# Patient Record
Sex: MALE | Race: WHITE
[De-identification: names, ages, dates, MRNs, and addresses within clinical notes are randomized per-mention and may not be internally consistent; named-entity substitution may affect disease eponyms.]

---

## 2017-06-06 ENCOUNTER — PRIOR ORIGINAL RECORDS (OUTPATIENT)
Dept: OTHER | Age: 82
End: 2017-06-06

## 2017-09-12 ENCOUNTER — PRIOR ORIGINAL RECORDS (OUTPATIENT)
Dept: OTHER | Age: 82
End: 2017-09-12

## 2018-04-02 ENCOUNTER — PRIOR ORIGINAL RECORDS (OUTPATIENT)
Dept: OTHER | Age: 83
End: 2018-04-02

## 2018-05-03 ENCOUNTER — PRIOR ORIGINAL RECORDS (OUTPATIENT)
Dept: OTHER | Age: 83
End: 2018-05-03

## 2018-05-04 ENCOUNTER — PRIOR ORIGINAL RECORDS (OUTPATIENT)
Dept: OTHER | Age: 83
End: 2018-05-04

## 2018-05-04 LAB
ALBUMIN: 4.1 G/DL
ALKALINE PHOSPHATATE(ALK PHOS): 99 IU/L
ALT (SGPT): 13 U/L
AST (SGOT): 18 U/L
BILIRUBIN TOTAL: 0.7 MG/DL
BUN: 21 MG/DL
CALCIUM: 9.4 MG/DL
CHLORIDE: 102 MEQ/L
CREATININE, SERUM: 1.64 MG/DL
FREE T4: 1.3 MG/DL
GLOBULIN: 2.8 G/DL
GLUCOSE: 93 MG/DL
HEMATOCRIT: 30.3 %
HEMOGLOBIN: 10.4 G/DL
PLATELETS: 99 K/UL
POTASSIUM, SERUM: 4.2 MEQ/L
PROTEIN, TOTAL: 6.9 G/DL
RED BLOOD COUNT: 3.19 X 10-6/U
SGOT (AST): 18 IU/L
SGPT (ALT): 13 IU/L
SODIUM: 136 MEQ/L
THYROID STIMULATING HORMONE: 4.13 MLU/L
WHITE BLOOD COUNT: 7.7 X 10-3/U

## 2018-05-08 ENCOUNTER — PRIOR ORIGINAL RECORDS (OUTPATIENT)
Dept: OTHER | Age: 83
End: 2018-05-08

## 2018-05-08 ENCOUNTER — HOSPITAL (OUTPATIENT)
Dept: OTHER | Age: 83
End: 2018-05-08
Attending: INTERNAL MEDICINE

## 2018-05-08 LAB
ANALYZER ANC (IANC): ABNORMAL
ANION GAP SERPL CALC-SCNC: 15 MMOL/L (ref 10–20)
BUN SERPL-MCNC: 22 MG/DL (ref 6–20)
BUN/CREAT SERPL: 12 (ref 7–25)
CALCIUM SERPL-MCNC: 9.1 MG/DL (ref 8.4–10.2)
CHLORIDE: 102 MMOL/L (ref 98–107)
CO2 SERPL-SCNC: 24 MMOL/L (ref 21–32)
CREAT SERPL-MCNC: 1.8 MG/DL (ref 0.67–1.17)
ERYTHROCYTE [DISTWIDTH] IN BLOOD: 13 % (ref 11–15)
GLUCOSE SERPL-MCNC: 109 MG/DL (ref 65–99)
HEMATOCRIT: 33.2 % (ref 39–51)
HGB BLD-MCNC: 11.2 GM/DL (ref 13–17)
MCH RBC QN AUTO: 31.7 PG (ref 26–34)
MCHC RBC AUTO-ENTMCNC: 33.7 GM/DL (ref 32–36.5)
MCV RBC AUTO: 94.1 FL (ref 78–100)
PERCENT NRBC: ABNORMAL
PLATELET # BLD: 215 THOUSAND/MCL (ref 140–450)
POTASSIUM SERPL-SCNC: 4.2 MMOL/L (ref 3.4–5.1)
RBC # BLD: 3.53 MILLION/MCL (ref 4.5–5.9)
SODIUM SERPL-SCNC: 137 MMOL/L (ref 135–145)
WBC # BLD: 7.4 THOUSAND/MCL (ref 4.2–11)

## 2018-05-09 LAB
BUN: 22 MG/DL
CALCIUM: 9.1 MG/DL
CHLORIDE: 102 MEQ/L
CREATININE, SERUM: 1.8 MG/DL
GLUCOSE: 109 MG/DL
HEMATOCRIT: 33.2 %
HEMOGLOBIN: 11.2 G/DL
PLATELETS: 215 K/UL
POTASSIUM, SERUM: 4.2 MEQ/L
RED BLOOD COUNT: 3.53 X 10-6/U
SODIUM: 137 MEQ/L
WHITE BLOOD COUNT: 7.4 X 10-3/U

## 2018-05-15 ENCOUNTER — PRIOR ORIGINAL RECORDS (OUTPATIENT)
Dept: OTHER | Age: 83
End: 2018-05-15

## 2018-05-22 ENCOUNTER — TELEPHONE (OUTPATIENT)
Dept: INTERVENTIONAL RADIOLOGY/VASCULAR | Facility: HOSPITAL | Age: 83
End: 2018-05-22

## 2018-06-12 ENCOUNTER — PRIOR ORIGINAL RECORDS (OUTPATIENT)
Dept: OTHER | Age: 83
End: 2018-06-12

## 2018-06-14 ENCOUNTER — HOSPITAL ENCOUNTER (OUTPATIENT)
Dept: CT IMAGING | Facility: HOSPITAL | Age: 83
Discharge: HOME | End: 2018-06-14
Attending: THORACIC SURGERY (CARDIOTHORACIC VASCULAR SURGERY)
Payer: MEDICARE

## 2018-06-14 ENCOUNTER — MYAURORA ACCOUNT LINK (OUTPATIENT)
Dept: OTHER | Age: 83
End: 2018-06-14

## 2018-06-14 ENCOUNTER — PRIOR ORIGINAL RECORDS (OUTPATIENT)
Dept: OTHER | Age: 83
End: 2018-06-14

## 2018-06-14 ENCOUNTER — NURSE ONLY (OUTPATIENT)
Dept: RESPIRATORY THERAPY | Facility: HOSPITAL | Age: 83
End: 2018-06-14
Attending: THORACIC SURGERY (CARDIOTHORACIC VASCULAR SURGERY)
Payer: MEDICARE

## 2018-06-14 ENCOUNTER — HOSPITAL ENCOUNTER (OUTPATIENT)
Dept: CV DIAGNOSTICS | Facility: HOSPITAL | Age: 83
Discharge: HOME | End: 2018-06-14
Attending: INTERNAL MEDICINE
Payer: MEDICARE

## 2018-06-14 ENCOUNTER — HOSPITAL ENCOUNTER (OUTPATIENT)
Dept: ULTRASOUND IMAGING | Facility: HOSPITAL | Age: 83
Discharge: HOME | End: 2018-06-14
Attending: THORACIC SURGERY (CARDIOTHORACIC VASCULAR SURGERY)
Payer: MEDICARE

## 2018-06-14 DIAGNOSIS — I35.0 AORTIC STENOSIS: ICD-10-CM

## 2018-06-14 DIAGNOSIS — I35.0 NONRHEUMATIC AORTIC VALVE STENOSIS: ICD-10-CM

## 2018-06-14 PROCEDURE — 93018 CV STRESS TEST I&R ONLY: CPT | Performed by: INTERNAL MEDICINE

## 2018-06-14 PROCEDURE — 93017 CV STRESS TEST TRACING ONLY: CPT | Performed by: INTERNAL MEDICINE

## 2018-06-14 PROCEDURE — 71275 CT ANGIOGRAPHY CHEST: CPT | Performed by: THORACIC SURGERY (CARDIOTHORACIC VASCULAR SURGERY)

## 2018-06-14 PROCEDURE — 93350 STRESS TTE ONLY: CPT | Performed by: INTERNAL MEDICINE

## 2018-06-14 PROCEDURE — 94010 BREATHING CAPACITY TEST: CPT

## 2018-06-14 PROCEDURE — 93880 EXTRACRANIAL BILAT STUDY: CPT | Performed by: THORACIC SURGERY (CARDIOTHORACIC VASCULAR SURGERY)

## 2018-06-14 PROCEDURE — 75635 CT ANGIO ABDOMINAL ARTERIES: CPT | Performed by: THORACIC SURGERY (CARDIOTHORACIC VASCULAR SURGERY)

## 2018-06-14 RX ORDER — DOBUTAMINE HYDROCHLORIDE 100 MG/100ML
INJECTION INTRAVENOUS
Status: COMPLETED
Start: 2018-06-14 | End: 2018-06-14

## 2018-06-14 RX ADMIN — DOBUTAMINE HYDROCHLORIDE 5 MCG/KG/MIN: 100 INJECTION INTRAVENOUS at 14:45:00

## 2018-06-29 ENCOUNTER — PRIOR ORIGINAL RECORDS (OUTPATIENT)
Dept: OTHER | Age: 83
End: 2018-06-29

## 2018-07-03 NOTE — PROCEDURES
Tobi Uribe is a 19-year-old  male who stands 6 feet tall and weighs 190 pounds. He underwent simple spirometry in 6/14/18. He is under evaluation for a T AVR. No smoking history is recorded.   Results are as follows:    FVC is 2.96 L which

## 2018-07-05 ENCOUNTER — PRIOR ORIGINAL RECORDS (OUTPATIENT)
Dept: OTHER | Age: 83
End: 2018-07-05

## 2018-07-12 ENCOUNTER — TELEPHONE (OUTPATIENT)
Dept: CARDIOLOGY CLINIC | Facility: HOSPITAL | Age: 83
End: 2018-07-12

## 2018-07-12 NOTE — PROGRESS NOTES
Preop TAVR Instructions:    I spoke with patient/family and provided the following preop/admission instructions for upcoming transcatheter valve surgery on 7/19/18:  ·   I advised they come in the day prior to surgery at 11am, Mayank christianson and register as a

## 2018-07-12 NOTE — HISTORICAL OFFICE NOTE
Osmel Diego  : 1925  ACCOUNT:  [id number]  [phone]  PCP: Dr. Jason Galicia    TODAY'S DATE:  2018  DICTATED BY:  DEVIN Palomino at [facility] 694:  Severe a Prilosec, Flonase, fludrocortisone, Toprol, glucosamine, niacin, Casodex, Lipitor, and Synthroid. ALLERGIES:   The patient has no known drug allergies.          SOCIAL HISTORY:   The patient currently does not abuse ethanol or tobacco.       FAMILY HI

## 2018-07-12 NOTE — H&P
[facility]  [facility]    Masha Posadas Patient Status:  Inpatient    1925 MRN [id number]   [facility] 8NE-A Attending Alyssa Roberts MD   Hosp Day # 0 PCP Flakito Chandra MD     History of Present Illness: Drug use: No                 Family Hostory:  Family History   Problem Relation Age of Onset   • Heart Disorder Father    • Heart Disorder Mother         Medications:    No prescriptions prior to admission. Allergies:  Patient has no known allergies. challenge  Review of echocardiographic images with increasing doses of dobutamine suggest a mild increase in overall LV contractility with a low normal LVEF of approximately 50% visually estimated.  The LVOT VTI remains unchanged and normal at 5 mcg of dobu questions; plan for TAVR in am; likely right TF; his creat his 1.5 and he has a PPM    Yfn Wagner MD [facility]

## 2018-07-18 ENCOUNTER — ANESTHESIA EVENT (OUTPATIENT)
Dept: CARDIAC SURGERY | Facility: HOSPITAL | Age: 83
DRG: 267 | End: 2018-07-18
Payer: MEDICARE

## 2018-07-18 ENCOUNTER — APPOINTMENT (OUTPATIENT)
Dept: GENERAL RADIOLOGY | Facility: HOSPITAL | Age: 83
DRG: 267 | End: 2018-07-18
Attending: NURSE PRACTITIONER
Payer: MEDICARE

## 2018-07-18 ENCOUNTER — HOSPITAL ENCOUNTER (INPATIENT)
Facility: HOSPITAL | Age: 83
LOS: 5 days | Discharge: HOME HEALTH | DRG: 267 | End: 2018-07-23
Attending: INTERNAL MEDICINE | Admitting: INTERNAL MEDICINE
Payer: MEDICARE

## 2018-07-18 LAB
ALBUMIN SERPL-MCNC: 3.7 G/DL (ref 3.5–4.8)
ALBUMIN/GLOB SERPL: 1 {RATIO} (ref 1–2)
ALP LIVER SERPL-CCNC: 95 U/L (ref 45–117)
ALT SERPL-CCNC: 17 U/L (ref 17–63)
ANION GAP SERPL CALC-SCNC: 8 MMOL/L (ref 0–18)
ANTIBODY SCREEN: NEGATIVE
AST SERPL-CCNC: 17 U/L (ref 15–41)
ATRIAL RATE: 62 BPM
BASOPHILS # BLD AUTO: 0.04 X10(3) UL (ref 0–0.1)
BASOPHILS NFR BLD AUTO: 0.4 %
BILIRUB SERPL-MCNC: 0.5 MG/DL (ref 0.1–2)
BUN BLD-MCNC: 21 MG/DL (ref 8–20)
BUN/CREAT SERPL: 13 (ref 10–20)
CALCIUM BLD-MCNC: 8.9 MG/DL (ref 8.3–10.3)
CHLORIDE: 105 MMOL/L (ref 101–111)
CO2: 26 MMOL/L (ref 22–32)
CREAT BLD-MCNC: 1.62 MG/DL (ref 0.7–1.3)
EOSINOPHIL # BLD AUTO: 0.33 X10(3) UL (ref 0–0.3)
EOSINOPHIL NFR BLD AUTO: 3.4 %
ERYTHROCYTE [DISTWIDTH] IN BLOOD BY AUTOMATED COUNT: 13.4 % (ref 11.5–16)
EST. AVERAGE GLUCOSE BLD GHB EST-MCNC: 123 MG/DL (ref 68–126)
GLOBULIN PLAS-MCNC: 3.7 G/DL (ref 2.5–3.7)
GLUCOSE BLD-MCNC: 99 MG/DL (ref 70–99)
HAV IGM SER QL: 2.1 MG/DL (ref 1.8–2.5)
HBA1C MFR BLD HPLC: 5.9 % (ref ?–5.7)
HCT VFR BLD AUTO: 30.9 % (ref 37–53)
HGB BLD-MCNC: 10.3 G/DL (ref 13–17)
IMMATURE GRANULOCYTE COUNT: 0.03 X10(3) UL (ref 0–1)
IMMATURE GRANULOCYTE RATIO %: 0.3 %
INR BLD: 1.1 (ref 0.9–1.1)
LYMPHOCYTES # BLD AUTO: 2.34 X10(3) UL (ref 0.9–4)
LYMPHOCYTES NFR BLD AUTO: 24.4 %
M PROTEIN MFR SERPL ELPH: 7.4 G/DL (ref 6.1–8.3)
MCH RBC QN AUTO: 31.2 PG (ref 27–33.2)
MCHC RBC AUTO-ENTMCNC: 33.3 G/DL (ref 31–37)
MCV RBC AUTO: 93.6 FL (ref 80–99)
MONOCYTES # BLD AUTO: 0.91 X10(3) UL (ref 0.1–1)
MONOCYTES NFR BLD AUTO: 9.5 %
NEUTROPHIL ABS PRELIM: 5.93 X10 (3) UL (ref 1.3–6.7)
NEUTROPHILS # BLD AUTO: 5.93 X10(3) UL (ref 1.3–6.7)
NEUTROPHILS NFR BLD AUTO: 62 %
OSMOLALITY SERPL CALC.SUM OF ELEC: 291 MOSM/KG (ref 275–295)
P-R INTERVAL: 186 MS
PLATELET # BLD AUTO: 194 10(3)UL (ref 150–450)
POTASSIUM SERPL-SCNC: 4.1 MMOL/L (ref 3.6–5.1)
PRO-BETA NATRIURETIC PEPTIDE: 1033 PG/ML (ref ?–450)
PSA SERPL DL<=0.01 NG/ML-MCNC: 14.6 SECONDS (ref 12.4–14.7)
Q-T INTERVAL: 518 MS
QRS DURATION: 200 MS
QTC CALCULATION (BEZET): 525 MS
R AXIS: -62 DEGREES
RBC # BLD AUTO: 3.3 X10(6)UL (ref 3.8–5.8)
RED CELL DISTRIBUTION WIDTH-SD: 46.3 FL (ref 35.1–46.3)
RH BLOOD TYPE: POSITIVE
SODIUM SERPL-SCNC: 139 MMOL/L (ref 136–144)
T AXIS: 94 DEGREES
VENTRICULAR RATE: 62 BPM
WBC # BLD AUTO: 9.6 X10(3) UL (ref 4–13)

## 2018-07-18 PROCEDURE — 83735 ASSAY OF MAGNESIUM: CPT | Performed by: NURSE PRACTITIONER

## 2018-07-18 PROCEDURE — 86850 RBC ANTIBODY SCREEN: CPT | Performed by: NURSE PRACTITIONER

## 2018-07-18 PROCEDURE — 85610 PROTHROMBIN TIME: CPT | Performed by: NURSE PRACTITIONER

## 2018-07-18 PROCEDURE — 83880 ASSAY OF NATRIURETIC PEPTIDE: CPT | Performed by: NURSE PRACTITIONER

## 2018-07-18 PROCEDURE — 36569 INSJ PICC 5 YR+ W/O IMAGING: CPT

## 2018-07-18 PROCEDURE — 93005 ELECTROCARDIOGRAM TRACING: CPT

## 2018-07-18 PROCEDURE — 86901 BLOOD TYPING SEROLOGIC RH(D): CPT | Performed by: NURSE PRACTITIONER

## 2018-07-18 PROCEDURE — 93010 ELECTROCARDIOGRAM REPORT: CPT | Performed by: INTERNAL MEDICINE

## 2018-07-18 PROCEDURE — 86920 COMPATIBILITY TEST SPIN: CPT

## 2018-07-18 PROCEDURE — 76937 US GUIDE VASCULAR ACCESS: CPT

## 2018-07-18 PROCEDURE — 85025 COMPLETE CBC W/AUTO DIFF WBC: CPT | Performed by: NURSE PRACTITIONER

## 2018-07-18 PROCEDURE — 05H533Z INSERTION OF INFUSION DEVICE INTO RIGHT SUBCLAVIAN VEIN, PERCUTANEOUS APPROACH: ICD-10-PCS | Performed by: INTERNAL MEDICINE

## 2018-07-18 PROCEDURE — 83036 HEMOGLOBIN GLYCOSYLATED A1C: CPT | Performed by: NURSE PRACTITIONER

## 2018-07-18 PROCEDURE — 86900 BLOOD TYPING SEROLOGIC ABO: CPT | Performed by: NURSE PRACTITIONER

## 2018-07-18 PROCEDURE — 87081 CULTURE SCREEN ONLY: CPT | Performed by: NURSE PRACTITIONER

## 2018-07-18 PROCEDURE — 36592 COLLECT BLOOD FROM PICC: CPT

## 2018-07-18 PROCEDURE — 80053 COMPREHEN METABOLIC PANEL: CPT | Performed by: NURSE PRACTITIONER

## 2018-07-18 PROCEDURE — 71045 X-RAY EXAM CHEST 1 VIEW: CPT | Performed by: NURSE PRACTITIONER

## 2018-07-18 RX ORDER — POLYETHYLENE GLYCOL 3350 17 G/17G
17 POWDER, FOR SOLUTION ORAL DAILY
COMMUNITY

## 2018-07-18 RX ORDER — CHLORHEXIDINE GLUCONATE 4 G/100ML
30 SOLUTION TOPICAL ONCE
Status: COMPLETED | OUTPATIENT
Start: 2018-07-18 | End: 2018-07-18

## 2018-07-18 RX ORDER — LEVOTHYROXINE SODIUM 0.05 MG/1
50 TABLET ORAL EVERY OTHER DAY
COMMUNITY

## 2018-07-18 RX ORDER — ATORVASTATIN CALCIUM 40 MG/1
40 TABLET, FILM COATED ORAL NIGHTLY
Status: DISCONTINUED | OUTPATIENT
Start: 2018-07-18 | End: 2018-07-23

## 2018-07-18 RX ORDER — POLYETHYLENE GLYCOL 3350 17 G/17G
17 POWDER, FOR SOLUTION ORAL DAILY
Status: DISCONTINUED | OUTPATIENT
Start: 2018-07-19 | End: 2018-07-23

## 2018-07-18 RX ORDER — MULTIVIT-MIN/IRON FUM/FOLIC AC 7.5 MG-4
1 TABLET ORAL DAILY
COMMUNITY

## 2018-07-18 RX ORDER — FLUTICASONE PROPIONATE 50 MCG
1 SPRAY, SUSPENSION (ML) NASAL 2 TIMES DAILY
COMMUNITY

## 2018-07-18 RX ORDER — LEVOTHYROXINE SODIUM 0.1 MG/1
100 TABLET ORAL EVERY OTHER DAY
Status: DISCONTINUED | OUTPATIENT
Start: 2018-07-19 | End: 2018-07-23

## 2018-07-18 RX ORDER — FLUTICASONE PROPIONATE 50 MCG
1 SPRAY, SUSPENSION (ML) NASAL 2 TIMES DAILY
Status: DISCONTINUED | OUTPATIENT
Start: 2018-07-18 | End: 2018-07-23

## 2018-07-18 RX ORDER — LEVOTHYROXINE SODIUM 0.05 MG/1
100 TABLET ORAL EVERY OTHER DAY
COMMUNITY

## 2018-07-18 RX ORDER — OXYBUTYNIN CHLORIDE 5 MG/1
5 TABLET ORAL 3 TIMES DAILY
Status: DISCONTINUED | OUTPATIENT
Start: 2018-07-18 | End: 2018-07-20

## 2018-07-18 RX ORDER — SODIUM CHLORIDE 9 MG/ML
INJECTION, SOLUTION INTRAVENOUS CONTINUOUS
Status: DISCONTINUED | OUTPATIENT
Start: 2018-07-18 | End: 2018-07-18

## 2018-07-18 RX ORDER — SODIUM CHLORIDE 0.9 % (FLUSH) 0.9 %
10 SYRINGE (ML) INJECTION EVERY 12 HOURS
Status: DISCONTINUED | OUTPATIENT
Start: 2018-07-18 | End: 2018-07-23

## 2018-07-18 RX ORDER — METOPROLOL SUCCINATE 50 MG/1
50 TABLET, EXTENDED RELEASE ORAL
Status: DISCONTINUED | OUTPATIENT
Start: 2018-07-19 | End: 2018-07-22

## 2018-07-18 RX ORDER — SODIUM CHLORIDE 9 MG/ML
INJECTION, SOLUTION INTRAVENOUS CONTINUOUS
Status: DISCONTINUED | OUTPATIENT
Start: 2018-07-19 | End: 2018-07-19

## 2018-07-18 RX ORDER — MELATONIN
500 DAILY
Status: DISCONTINUED | OUTPATIENT
Start: 2018-07-19 | End: 2018-07-23

## 2018-07-18 RX ORDER — ACETAMINOPHEN 325 MG/1
650 TABLET ORAL NIGHTLY
Status: DISCONTINUED | OUTPATIENT
Start: 2018-07-18 | End: 2018-07-23

## 2018-07-18 RX ORDER — LEVOTHYROXINE SODIUM 0.05 MG/1
50 TABLET ORAL EVERY OTHER DAY
Status: DISCONTINUED | OUTPATIENT
Start: 2018-07-20 | End: 2018-07-23

## 2018-07-18 RX ORDER — OMEPRAZOLE 20 MG/1
20 CAPSULE, DELAYED RELEASE ORAL
COMMUNITY

## 2018-07-18 NOTE — PROGRESS NOTES
1217-Direct admit from home plan for elective TAVR tomorrow  Cc dyspnea worsening and fatigue  A/o x 4; follows commands  shu simpson  Seen patient and did consents  Patient oriented to room; use of call light and tv controls  Bed locked in lowest

## 2018-07-18 NOTE — CM/SW NOTE
07/18/18 1600   CM/SW Referral Data   Referral Source Physician   Reason for Referral Discharge planning;Protocol order set   Specify order set TAVR   Informant Patient   Pertinent Medical Hx   Primary Care Physician Name Ruthie Gray   Patient Info

## 2018-07-18 NOTE — PLAN OF CARE
Rec in bed. Pt and VS appear stable. TAVR tomorrow   Pt refusing to get up and walk. Agrees to sit in chair for dinner. Will try to encourage 5M walk before shifts end. Midline placed and labs sent. Needs met call light in reach will cont to monitor.

## 2018-07-18 NOTE — ANESTHESIA PREPROCEDURE EVALUATION
PRE-OP EVALUATION    Patient Name: Juan Manuel Gutierrez    Pre-op Diagnosis: aortic stenosis    Procedure(s):  aortic valve replacement, 26mm Montana, right femoral approach    Surgeon(s) and Role:     * Eris Camacho MD - Primary     * Fabi Bocanegra MD nightly. Disp:  Rfl:    bicalutamide (CASODEX) 50 MG Oral Tab Take 50 mg by mouth 4 (four) times a week. Disp:  Rfl:    Acetaminophen (TYLENOL 8 HOUR OR) Take by mouth nightly.    Disp:  Rfl:    Oxybutynin Chloride (DITROPAN) 5 MG Oral Tab Take 5 mg by mo documents mildly depressed LV systolic function with moderate to severe aortic stenosis at baseline.  With increasing doses of dobutamine there is no evidence of a contractile reserve, however, the increase in aortic valve peak velocity and mean gradient is 4.1 07/18/2018    07/18/2018   CO2 26.0 07/18/2018   BUN 21 (H) 07/18/2018   CREATSERUM 1.62 (H) 07/18/2018   GLU 99 07/18/2018   CA 8.9 07/18/2018       Lab Results  Component Value Date   INR 1.10 07/18/2018         Airway      Mallampati: II  Mout

## 2018-07-19 ENCOUNTER — APPOINTMENT (OUTPATIENT)
Dept: CV DIAGNOSTICS | Facility: HOSPITAL | Age: 83
DRG: 267 | End: 2018-07-19
Attending: NURSE PRACTITIONER
Payer: MEDICARE

## 2018-07-19 ENCOUNTER — SURGERY (OUTPATIENT)
Age: 83
End: 2018-07-19

## 2018-07-19 ENCOUNTER — APPOINTMENT (OUTPATIENT)
Dept: GENERAL RADIOLOGY | Facility: HOSPITAL | Age: 83
DRG: 267 | End: 2018-07-19
Attending: NURSE PRACTITIONER
Payer: MEDICARE

## 2018-07-19 ENCOUNTER — ANESTHESIA (OUTPATIENT)
Dept: CARDIAC SURGERY | Facility: HOSPITAL | Age: 83
DRG: 267 | End: 2018-07-19
Payer: MEDICARE

## 2018-07-19 PROBLEM — Z95.2 S/P TAVR (TRANSCATHETER AORTIC VALVE REPLACEMENT): Status: ACTIVE | Noted: 2018-07-19

## 2018-07-19 LAB
ALBUMIN SERPL-MCNC: 3.2 G/DL (ref 3.5–4.8)
ALBUMIN/GLOB SERPL: 0.9 {RATIO} (ref 1–2)
ALP LIVER SERPL-CCNC: 88 U/L (ref 45–117)
ALT SERPL-CCNC: 15 U/L (ref 17–63)
ANION GAP SERPL CALC-SCNC: 7 MMOL/L (ref 0–18)
APTT PPP: 48.2 SECONDS (ref 26.1–34.6)
AST SERPL-CCNC: 19 U/L (ref 15–41)
ATRIAL RATE: 60 BPM
BILIRUB SERPL-MCNC: 0.6 MG/DL (ref 0.1–2)
BUN BLD-MCNC: 17 MG/DL (ref 8–20)
BUN/CREAT SERPL: 12.1 (ref 10–20)
CALCIUM BLD-MCNC: 8.5 MG/DL (ref 8.3–10.3)
CHLORIDE SERPL-SCNC: 112 MMOL/L (ref 101–111)
CO2 SERPL-SCNC: 24 MMOL/L (ref 22–32)
CREAT BLD-MCNC: 1.4 MG/DL (ref 0.7–1.3)
ERYTHROCYTE [DISTWIDTH] IN BLOOD BY AUTOMATED COUNT: 13.4 % (ref 11.5–16)
FIBRINOGEN: 246 MG/DL (ref 200–446)
GLOBULIN PLAS-MCNC: 3.4 G/DL (ref 2.5–3.7)
GLUCOSE BLD-MCNC: 105 MG/DL (ref 70–99)
GLUCOSE BLD-MCNC: 105 MG/DL (ref 70–99)
HAV IGM SER QL: 2 MG/DL (ref 1.8–2.5)
HCT VFR BLD AUTO: 30.1 % (ref 37–53)
HGB BLD-MCNC: 9.9 G/DL (ref 13–17)
INR BLD: 1.22 (ref 0.9–1.1)
ISTAT ACTIVATED CLOTTING TIME: 125 SECONDS (ref 74–137)
ISTAT ACTIVATED CLOTTING TIME: 356 SECONDS (ref 74–137)
ISTAT BLOOD GAS BASE DEFICIT: -1 MMOL/L
ISTAT BLOOD GAS HCO3: 24 MEQ/L (ref 22–26)
ISTAT BLOOD GAS O2 SATURATION: 100 % (ref 92–100)
ISTAT BLOOD GAS PCO2: 41 MMHG (ref 35–45)
ISTAT BLOOD GAS PH: 7.37 (ref 7.35–7.45)
ISTAT BLOOD GAS PO2: 269 MMHG (ref 80–105)
ISTAT BLOOD GAS TCO2: 25 MMOL/L (ref 22–32)
ISTAT HEMATOCRIT: 29 % (ref 37–53)
ISTAT IONIZED CALCIUM: 1.23 MMOL/L (ref 1.12–1.32)
ISTAT POTASSIUM: 3.6 MMOL/L (ref 3.6–5.1)
ISTAT SODIUM: 142 MMOL/L (ref 136–144)
M PROTEIN MFR SERPL ELPH: 6.6 G/DL (ref 6.1–8.3)
MCH RBC QN AUTO: 30.2 PG (ref 27–33.2)
MCHC RBC AUTO-ENTMCNC: 32.9 G/DL (ref 31–37)
MCV RBC AUTO: 91.8 FL (ref 80–99)
OSMOLALITY SERPL CALC.SUM OF ELEC: 298 MOSM/KG (ref 275–295)
P-R INTERVAL: 220 MS
PHOSPHATE SERPL-MCNC: 2.8 MG/DL (ref 2.5–4.9)
PLATELET # BLD AUTO: 165 10(3)UL (ref 150–450)
POTASSIUM SERPL-SCNC: 3.6 MMOL/L (ref 3.6–5.1)
POTASSIUM SERPL-SCNC: 4.1 MMOL/L (ref 3.6–5.1)
PSA SERPL DL<=0.01 NG/ML-MCNC: 15.9 SECONDS (ref 12.4–14.7)
Q-T INTERVAL: 522 MS
QRS DURATION: 172 MS
QTC CALCULATION (BEZET): 522 MS
R AXIS: -62 DEGREES
RBC # BLD AUTO: 3.28 X10(6)UL (ref 3.8–5.8)
RED CELL DISTRIBUTION WIDTH-SD: 45.6 FL (ref 35.1–46.3)
SODIUM SERPL-SCNC: 143 MMOL/L (ref 136–144)
T AXIS: 93 DEGREES
VENTRICULAR RATE: 60 BPM
WBC # BLD AUTO: 7.8 X10(3) UL (ref 4–13)

## 2018-07-19 PROCEDURE — 85610 PROTHROMBIN TIME: CPT | Performed by: NURSE PRACTITIONER

## 2018-07-19 PROCEDURE — 93005 ELECTROCARDIOGRAM TRACING: CPT

## 2018-07-19 PROCEDURE — 71045 X-RAY EXAM CHEST 1 VIEW: CPT | Performed by: NURSE PRACTITIONER

## 2018-07-19 PROCEDURE — B310YZZ FLUOROSCOPY OF THORACIC AORTA USING OTHER CONTRAST: ICD-10-PCS | Performed by: INTERNAL MEDICINE

## 2018-07-19 PROCEDURE — 85347 COAGULATION TIME ACTIVATED: CPT

## 2018-07-19 PROCEDURE — 85027 COMPLETE CBC AUTOMATED: CPT | Performed by: NURSE PRACTITIONER

## 2018-07-19 PROCEDURE — 84100 ASSAY OF PHOSPHORUS: CPT | Performed by: NURSE PRACTITIONER

## 2018-07-19 PROCEDURE — BW1CYZZ FLUOROSCOPY OF LOWER EXTREMITY USING OTHER CONTRAST: ICD-10-PCS | Performed by: INTERNAL MEDICINE

## 2018-07-19 PROCEDURE — 02RF38Z REPLACEMENT OF AORTIC VALVE WITH ZOOPLASTIC TISSUE, PERCUTANEOUS APPROACH: ICD-10-PCS | Performed by: INTERNAL MEDICINE

## 2018-07-19 PROCEDURE — 80053 COMPREHEN METABOLIC PANEL: CPT | Performed by: NURSE PRACTITIONER

## 2018-07-19 PROCEDURE — 85014 HEMATOCRIT: CPT

## 2018-07-19 PROCEDURE — B24BZZ4 ULTRASONOGRAPHY OF HEART WITH AORTA, TRANSESOPHAGEAL: ICD-10-PCS | Performed by: INTERNAL MEDICINE

## 2018-07-19 PROCEDURE — 83735 ASSAY OF MAGNESIUM: CPT | Performed by: NURSE PRACTITIONER

## 2018-07-19 PROCEDURE — 93355 ECHO TRANSESOPHAGEAL (TEE): CPT | Performed by: NURSE PRACTITIONER

## 2018-07-19 PROCEDURE — B41CYZZ FLUOROSCOPY OF PELVIC ARTERIES USING OTHER CONTRAST: ICD-10-PCS | Performed by: INTERNAL MEDICINE

## 2018-07-19 PROCEDURE — 93010 ELECTROCARDIOGRAM REPORT: CPT | Performed by: INTERNAL MEDICINE

## 2018-07-19 PROCEDURE — 84295 ASSAY OF SERUM SODIUM: CPT

## 2018-07-19 PROCEDURE — 85384 FIBRINOGEN ACTIVITY: CPT | Performed by: NURSE PRACTITIONER

## 2018-07-19 PROCEDURE — 85730 THROMBOPLASTIN TIME PARTIAL: CPT | Performed by: NURSE PRACTITIONER

## 2018-07-19 PROCEDURE — 82803 BLOOD GASES ANY COMBINATION: CPT

## 2018-07-19 PROCEDURE — 84132 ASSAY OF SERUM POTASSIUM: CPT | Performed by: INTERNAL MEDICINE

## 2018-07-19 PROCEDURE — 82330 ASSAY OF CALCIUM: CPT

## 2018-07-19 PROCEDURE — 84132 ASSAY OF SERUM POTASSIUM: CPT

## 2018-07-19 DEVICE — VALVE SAPIEN 26MM COMMANDER: Type: IMPLANTABLE DEVICE | Site: AORTA | Status: FUNCTIONAL

## 2018-07-19 RX ORDER — DOBUTAMINE HYDROCHLORIDE 200 MG/100ML
INJECTION INTRAVENOUS CONTINUOUS PRN
Status: DISCONTINUED | OUTPATIENT
Start: 2018-07-19 | End: 2018-07-20

## 2018-07-19 RX ORDER — FUROSEMIDE 10 MG/ML
20 INJECTION INTRAMUSCULAR; INTRAVENOUS ONCE
Status: COMPLETED | OUTPATIENT
Start: 2018-07-19 | End: 2018-07-19

## 2018-07-19 RX ORDER — FAMOTIDINE 10 MG/ML
20 INJECTION, SOLUTION INTRAVENOUS DAILY
Status: DISCONTINUED | OUTPATIENT
Start: 2018-07-19 | End: 2018-07-19

## 2018-07-19 RX ORDER — NITROGLYCERIN 20 MG/100ML
INJECTION INTRAVENOUS CONTINUOUS PRN
Status: DISCONTINUED | OUTPATIENT
Start: 2018-07-19 | End: 2018-07-20

## 2018-07-19 RX ORDER — ALBUMIN, HUMAN INJ 5% 5 %
250 SOLUTION INTRAVENOUS ONCE AS NEEDED
Status: ACTIVE | OUTPATIENT
Start: 2018-07-19 | End: 2018-07-19

## 2018-07-19 RX ORDER — FAMOTIDINE 20 MG/1
20 TABLET ORAL DAILY
Status: DISCONTINUED | OUTPATIENT
Start: 2018-07-19 | End: 2018-07-23

## 2018-07-19 RX ORDER — CEFAZOLIN SODIUM 1 G/3ML
INJECTION, POWDER, FOR SOLUTION INTRAMUSCULAR; INTRAVENOUS
Status: DISCONTINUED | OUTPATIENT
Start: 2018-07-19 | End: 2018-07-19 | Stop reason: HOSPADM

## 2018-07-19 RX ORDER — SODIUM CHLORIDE, SODIUM LACTATE, POTASSIUM CHLORIDE, CALCIUM CHLORIDE 600; 310; 30; 20 MG/100ML; MG/100ML; MG/100ML; MG/100ML
INJECTION, SOLUTION INTRAVENOUS CONTINUOUS
Status: DISCONTINUED | OUTPATIENT
Start: 2018-07-19 | End: 2018-07-19

## 2018-07-19 RX ORDER — SODIUM CHLORIDE 9 MG/ML
INJECTION, SOLUTION INTRAVENOUS CONTINUOUS
Status: DISCONTINUED | OUTPATIENT
Start: 2018-07-19 | End: 2018-07-23

## 2018-07-19 RX ORDER — ONDANSETRON 2 MG/ML
4 INJECTION INTRAMUSCULAR; INTRAVENOUS AS NEEDED
Status: ACTIVE | OUTPATIENT
Start: 2018-07-19 | End: 2018-07-19

## 2018-07-19 RX ORDER — CEFAZOLIN SODIUM/WATER 2 G/20 ML
2 SYRINGE (ML) INTRAVENOUS EVERY 12 HOURS
Status: COMPLETED | OUTPATIENT
Start: 2018-07-19 | End: 2018-07-20

## 2018-07-19 RX ORDER — MORPHINE SULFATE 4 MG/ML
2 INJECTION, SOLUTION INTRAMUSCULAR; INTRAVENOUS EVERY 5 MIN PRN
Status: ACTIVE | OUTPATIENT
Start: 2018-07-19 | End: 2018-07-19

## 2018-07-19 RX ORDER — ACETAMINOPHEN 325 MG/1
650 TABLET ORAL EVERY 4 HOURS PRN
Status: DISCONTINUED | OUTPATIENT
Start: 2018-07-19 | End: 2018-07-23

## 2018-07-19 RX ORDER — HYDRALAZINE HYDROCHLORIDE 20 MG/ML
10 INJECTION INTRAMUSCULAR; INTRAVENOUS EVERY 6 HOURS PRN
Status: DISCONTINUED | OUTPATIENT
Start: 2018-07-19 | End: 2018-07-23

## 2018-07-19 RX ORDER — ONDANSETRON 2 MG/ML
4 INJECTION INTRAMUSCULAR; INTRAVENOUS EVERY 6 HOURS PRN
Status: ACTIVE | OUTPATIENT
Start: 2018-07-19 | End: 2018-07-21

## 2018-07-19 RX ORDER — POTASSIUM CHLORIDE 20 MEQ/1
40 TABLET, EXTENDED RELEASE ORAL EVERY 4 HOURS
Status: COMPLETED | OUTPATIENT
Start: 2018-07-19 | End: 2018-07-19

## 2018-07-19 RX ORDER — NALOXONE HYDROCHLORIDE 0.4 MG/ML
80 INJECTION, SOLUTION INTRAMUSCULAR; INTRAVENOUS; SUBCUTANEOUS AS NEEDED
Status: ACTIVE | OUTPATIENT
Start: 2018-07-19 | End: 2018-07-19

## 2018-07-19 RX ORDER — DOCUSATE SODIUM 100 MG/1
100 CAPSULE, LIQUID FILLED ORAL 2 TIMES DAILY
Status: DISCONTINUED | OUTPATIENT
Start: 2018-07-19 | End: 2018-07-23

## 2018-07-19 RX ORDER — BISACODYL 10 MG
10 SUPPOSITORY, RECTAL RECTAL
Status: DISCONTINUED | OUTPATIENT
Start: 2018-07-19 | End: 2018-07-23

## 2018-07-19 NOTE — PLAN OF CARE
SCr 1.62 mg/dL; estimated CrCl: 31.3 mL/min. Post-op Ancef adjusted to 2 grams IV q12 hours x2 doses per protocol.     Ashok Callejas, PharmD  07/19/18 9:49 AM

## 2018-07-19 NOTE — OPERATIVE REPORT
Full note dictated,    26 mm Edward S3 Montana Valve placed  No acute complications    Conner Kurtz MD

## 2018-07-19 NOTE — PROCEDURES
Cardiology Transesophageal Echo Note     PRE and POST PROCEDURE DIAGNOSIS:   1. Aortic stenosis, severe and symptomatic. 2. S/pTAVR (26 mm S3 Montana)     PROCEDURE: Transesophageal Echocardiogram ROMMEL - intra-operative during TAVR.     The patient was alrea MD

## 2018-07-19 NOTE — OPERATIVE REPORT
[facility]    PATIENT'S NAME: Dane Zhou   ATTENDING PHYSICIAN: Corinne Padilla M.D. OPERATING PHYSICIAN: Theodore Mariee M.D.    PATIENT ACCOUNT#:   [de-identified]    LOCATION:  [address] [place] [address]  MEDICAL RECORD #:   [id number]       DATE OF BIR wire.  This was followed by dilation with a 16-Malawian sheath followed by placement of a 14-Malawian dilator followed by placement of a 14-Malawian Sargent G sheath. This was secured in place. Heparin was given to maintain an ACT over 250 seconds.     Next, us

## 2018-07-19 NOTE — ANESTHESIA POSTPROCEDURE EVALUATION
7900  1826 Patient Status:  Inpatient   Age/Gender 80year old male MRN [id number]   [facility] 6NE-A Attending Kelly Meraz MD   Hosp Day # 1 PCP Cristiana Horton MD       Anesthesia Post-op Note    Procedure(s):

## 2018-07-19 NOTE — PLAN OF CARE
0900-Received s/p TAVR, VS & groin cks per protocol, Labs drawn,EKG done. Yoselyn calibrated, watts patent with adequate amt. clear yellow urine. Awake, oriented, no neuro deficits, AV pacing per monitor.   CARDIOVASCULAR - ADULT    • Maintains optimal cardiac

## 2018-07-19 NOTE — PLAN OF CARE
CARDIOVASCULAR - ADULT    • Maintains optimal cardiac output and hemodynamic stability Progressing        Patient/Family Goals    • Patient/Family Long Term Goal Progressing    • Patient/Family Short Term Goal Progressing        Assumed care of pt at 1930.

## 2018-07-20 ENCOUNTER — APPOINTMENT (OUTPATIENT)
Dept: GENERAL RADIOLOGY | Facility: HOSPITAL | Age: 83
DRG: 267 | End: 2018-07-20
Attending: NURSE PRACTITIONER
Payer: MEDICARE

## 2018-07-20 ENCOUNTER — APPOINTMENT (OUTPATIENT)
Dept: CV DIAGNOSTICS | Facility: HOSPITAL | Age: 83
DRG: 267 | End: 2018-07-20
Attending: NURSE PRACTITIONER
Payer: MEDICARE

## 2018-07-20 LAB
ANION GAP SERPL CALC-SCNC: 8 MMOL/L (ref 0–18)
ATRIAL RATE: 66 BPM
ATRIAL RATE: 66 BPM
BUN BLD-MCNC: 20 MG/DL (ref 8–20)
BUN/CREAT SERPL: 13.9 (ref 10–20)
CALCIUM BLD-MCNC: 8.9 MG/DL (ref 8.3–10.3)
CHLORIDE SERPL-SCNC: 109 MMOL/L (ref 101–111)
CO2 SERPL-SCNC: 24 MMOL/L (ref 22–32)
CREAT BLD-MCNC: 1.44 MG/DL (ref 0.7–1.3)
ERYTHROCYTE [DISTWIDTH] IN BLOOD BY AUTOMATED COUNT: 13.8 % (ref 11.5–16)
GLUCOSE BLD-MCNC: 96 MG/DL (ref 70–99)
HAV IGM SER QL: 2.1 MG/DL (ref 1.8–2.5)
HCT VFR BLD AUTO: 31.7 % (ref 37–53)
HGB BLD-MCNC: 10.4 G/DL (ref 13–17)
INR BLD: 1.13 (ref 0.9–1.1)
MCH RBC QN AUTO: 30.2 PG (ref 27–33.2)
MCHC RBC AUTO-ENTMCNC: 32.8 G/DL (ref 31–37)
MCV RBC AUTO: 92.2 FL (ref 80–99)
OSMOLALITY SERPL CALC.SUM OF ELEC: 294 MOSM/KG (ref 275–295)
P AXIS: 101 DEGREES
P AXIS: 45 DEGREES
P-R INTERVAL: 166 MS
P-R INTERVAL: 172 MS
PLATELET # BLD AUTO: 156 10(3)UL (ref 150–450)
POTASSIUM SERPL-SCNC: 4 MMOL/L (ref 3.6–5.1)
PSA SERPL DL<=0.01 NG/ML-MCNC: 15 SECONDS (ref 12.4–14.7)
Q-T INTERVAL: 508 MS
Q-T INTERVAL: 510 MS
QRS DURATION: 192 MS
QRS DURATION: 194 MS
QTC CALCULATION (BEZET): 532 MS
QTC CALCULATION (BEZET): 534 MS
R AXIS: -62 DEGREES
R AXIS: -62 DEGREES
RBC # BLD AUTO: 3.44 X10(6)UL (ref 3.8–5.8)
RED CELL DISTRIBUTION WIDTH-SD: 46.8 FL (ref 35.1–46.3)
SODIUM SERPL-SCNC: 141 MMOL/L (ref 136–144)
T AXIS: 94 DEGREES
T AXIS: 96 DEGREES
VENTRICULAR RATE: 66 BPM
VENTRICULAR RATE: 66 BPM
WBC # BLD AUTO: 10.9 X10(3) UL (ref 4–13)

## 2018-07-20 PROCEDURE — 80048 BASIC METABOLIC PNL TOTAL CA: CPT | Performed by: NURSE PRACTITIONER

## 2018-07-20 PROCEDURE — 97161 PT EVAL LOW COMPLEX 20 MIN: CPT

## 2018-07-20 PROCEDURE — 83735 ASSAY OF MAGNESIUM: CPT | Performed by: NURSE PRACTITIONER

## 2018-07-20 PROCEDURE — 71045 X-RAY EXAM CHEST 1 VIEW: CPT | Performed by: NURSE PRACTITIONER

## 2018-07-20 PROCEDURE — 97165 OT EVAL LOW COMPLEX 30 MIN: CPT

## 2018-07-20 PROCEDURE — 93005 ELECTROCARDIOGRAM TRACING: CPT

## 2018-07-20 PROCEDURE — 93306 TTE W/DOPPLER COMPLETE: CPT | Performed by: NURSE PRACTITIONER

## 2018-07-20 PROCEDURE — 93010 ELECTROCARDIOGRAM REPORT: CPT | Performed by: INTERNAL MEDICINE

## 2018-07-20 PROCEDURE — 85027 COMPLETE CBC AUTOMATED: CPT | Performed by: NURSE PRACTITIONER

## 2018-07-20 PROCEDURE — 85610 PROTHROMBIN TIME: CPT | Performed by: NURSE PRACTITIONER

## 2018-07-20 PROCEDURE — 85610 PROTHROMBIN TIME: CPT | Performed by: INTERNAL MEDICINE

## 2018-07-20 PROCEDURE — 97530 THERAPEUTIC ACTIVITIES: CPT

## 2018-07-20 RX ORDER — ALFUZOSIN HYDROCHLORIDE 10 MG/1
10 TABLET, EXTENDED RELEASE ORAL
Qty: 30 TABLET | Refills: 0 | Status: SHIPPED | OUTPATIENT
Start: 2018-07-21 | End: 2018-07-23

## 2018-07-20 RX ORDER — ASPIRIN 81 MG/1
81 TABLET ORAL DAILY
Status: DISCONTINUED | OUTPATIENT
Start: 2018-07-20 | End: 2018-07-23

## 2018-07-20 RX ORDER — ASPIRIN 81 MG/1
81 TABLET ORAL DAILY
Qty: 30 TABLET | Refills: 3 | Status: SHIPPED | OUTPATIENT
Start: 2018-07-21 | End: 2018-07-23

## 2018-07-20 RX ORDER — ALFUZOSIN HYDROCHLORIDE 10 MG/1
10 TABLET, EXTENDED RELEASE ORAL
Status: DISCONTINUED | OUTPATIENT
Start: 2018-07-20 | End: 2018-07-23

## 2018-07-20 NOTE — OCCUPATIONAL THERAPY NOTE
OCCUPATIONAL THERAPY EVALUATION - INPATIENT     Room Number: 7236/7078-Y  Evaluation Date: 7/20/2018  Type of Evaluation: Initial  Presenting Problem: s/p TAVR    Physician Order: IP Consult to Occupational Therapy  Reason for Therapy: ADL/IADL Dysfunction glasses    Prior Level of Function: Pt reported being independent with ADLs PTA. He reported that he used to play golf but does not really do that anymore. Pt reports living with spouse that can help him with some things but not all.  Reported that he helps grooming such as brushing teeth?: None  -   Eating meals?: None    AM-PAC Score:  Score: 22  Approx Degree of Impairment: 25.8%  Standardized Score (AM-PAC Scale): 47.1  CMS Modifier (G-Code): CJ    FUNCTIONAL TRANSFER ASSESSMENT  Supine to Sit : Minimum a pending discharge is OT/PT home health.     Patient Complexity  Occupational Profile/Medical History LOW - Brief history including review of medical or therapy records    Specific performance deficits impacting engagement in ADL/IADL LOW  1 - 3 performance

## 2018-07-20 NOTE — HOME CARE LIAISON
Referral received for home health from  Puma Voss. I met with patient and his spouse they are good to go for home health. Brochure and my card left at beside.   Thank you for the referral

## 2018-07-20 NOTE — PHYSICAL THERAPY NOTE
PHYSICAL THERAPY QUICK EVALUATION - INPATIENT    Room Number: 2262/4255-A  Evaluation Date: 7/20/2018  Presenting Problem: s/p TAVR 7/19/18  Physician Order: PT Eval and Treat    Problem List  Principal Problem:    S/P TAVR (transcatheter aortic valve re right;Hard of hearing  Fall Risk: Standard fall risk    WEIGHT BEARING RESTRICTION                   PAIN ASSESSMENT  Ratin         RANGE OF MOTION AND STRENGTH ASSESSMENT  Upper extremity ROM and strength are within functional limits  Lower extremity activity and if you need to sit down to recove      Skilled Therapy Provided: Patient presents semi-reclined in bed. Pt able to complete supine to sit transfer and sit<>stand at Mod I. Pt ambulates x 350 ft at Mod I.   Pt observed with slow dneise otherwi ...    Patient was able to transfer At previous, functional level   Patient able to ambulate on level surfaces At previous, functional level

## 2018-07-20 NOTE — PLAN OF CARE
Assumed patient care this am. Patient hard of hearing. Alert, oriented x4. Denies pain. AV paced on monitor. Up to chair for breakfast, tolerated well.  Patient voided today and bladder scanner used to check post void residual. Results reported to cardiolog

## 2018-07-20 NOTE — PROGRESS NOTES
[facility]  [facility] Progress [facility]    Hema Sorto Patient Status:  Inpatient    1925 MRN [id number]   [facility] 6NE-A Attending Ariana Pettit MD   Hosp Day # 2 PCP Pato Dutta MD       Subjective:   Res INR 1.13 07/20/2018   PTP 15.0 07/20/2018   MG 2.1 07/20/2018   PHOS 2.8 07/19/2018         Lab Results  Component Value Date   INR 1.13 (H) 07/20/2018   INR 1.22 (H) 07/19/2018   INR 1.10 07/18/2018       Medications:      No current facility-administer am.

## 2018-07-20 NOTE — CONSULTS
Cardiothoracic Surgery  TAVR Consult       7/18/18   Referring: Dr Daphne Naidu  PCP: Dr Coleman De La Fuente  Reason for consult: severe symptomatic aortic stenosis  80year old with symptomatic aortic stenosis with increased SOB, SCHWARTZ, fatigue  PMH: HTN, HL  PSH: Pacer  Me of 3.9 cm x 3.9 cm x 3.8 cm and STJ with average diameter of 3.1 cm.  3.  Normal caliber ascending thoracic aorta with mild diffuse calcifications of the transverse arch and descending thoracic aorta noted.   4.  No significant subannular/LVOT calcification reviewed and pre-op labs have been ordered  CXR and CT of chest were reviewed   Pt was seen and examined by me today and after reviewing the chart and talking with the patient   the assessment after discussion with Dr Marnie Resendez  is that the patient is at   inter

## 2018-07-20 NOTE — CM/SW NOTE
07/20/18 1400   Discharge disposition   Expected discharge disposition Home-Health   Name of Facillity/Home Care/Hospice Residential   Discharge transportation Private car     Pt is s/p TAVR- Therapy recs [facility]. Pt and wife in agreement.  Choice offered a

## 2018-07-20 NOTE — PLAN OF CARE
CARDIOVASCULAR - ADULT    • Maintains optimal cardiac output and hemodynamic stability Progressing        DISCHARGE PLANNING    • Discharge to home or other facility with appropriate resources Progressing        Patient/Family Goals    • Patient/Family Oneal

## 2018-07-20 NOTE — PROGRESS NOTES
Patient able to void small amount of urine with low PVR. Will be d/c'd on uraxatral and f/u with PCP regarding urinary retention. RN notified me that patient was unstable on afternoon walk. Patient reports feeling ok, maybe just a little weak.  Will stay ov

## 2018-07-21 LAB — BLOOD TYPE BARCODE: 6200

## 2018-07-21 PROCEDURE — 97164 PT RE-EVAL EST PLAN CARE: CPT

## 2018-07-21 PROCEDURE — 97530 THERAPEUTIC ACTIVITIES: CPT

## 2018-07-21 NOTE — PLAN OF CARE
Problem: Patient/Family Goals  Goal: Patient/Family Long Term Goal  Patient's Long Term Goal: \"To get back to walking again and climbing the stairs. \"    Interventions:  - Increase amount of exercise slowly and steadily.  -Discharge planning  -PT/OT  - Se consult to assist with strengthening/mobility  - Encourage toileting schedule   Outcome: Progressing  Fall  Precautions in progress due to complaints of dizziness  Asked to call for assistance with ADls    Problem: DISCHARGE PLANNING  Goal: Discharge to [facility]

## 2018-07-21 NOTE — PROGRESS NOTES
[facility] Progress Note    Subjective:  Had a good night. Now urinating, one episode of incontinence.  Still a little unsteady when up to BR    Objective:  /49 (BP Location: Left arm)   Pulse 77   Temp 98.2 °F (36.8 °C) (Oral)   Resp 18   Ht

## 2018-07-21 NOTE — PHYSICAL THERAPY NOTE
PHYSICAL THERAPY EVALUATION - INPATIENT     Room Number: 5357/9433-E  Evaluation Date: 7/21/2018  Type of Evaluation: Re-evaluation  Physician Order: PT Eval and Treat    Presenting Problem: s/p TAVR 7/19/18  Reason for Therapy: Mobility Dysfunction of [place]: Pt is normally independent c ADLs, ambulates short community distances independently (limited distance due to SCHWARTZ), denies recent fall history. Pt enjoys golfing but has been unable to play due to weakness and difficulty breathing.     SUBJ 20   PT Approx Degree of Impairment Score: 35.83%   Standardized Score (AM-PAC Scale): 47.67   CMS Modifier (G-Code): CJ    FUNCTIONAL ABILITY STATUS  Gait Assessment   Gait Assistance: Not tested  Distance (ft): 0  Assistive Device: None  Pattern:  (n/a) independent bed mobility, transfers, stair negotiation, and gait. The patient is below baseline and would benefit from skilled inpatient PT to address the above deficits to assist patient in returning to prior to level of function.   DISCHARGE RECOMMENDATI

## 2018-07-21 NOTE — PLAN OF CARE
Dr. Catracho Cantu here to see patient, new orthostatics reviewed, plan to hold metoprolol , encourage fluids, discharge post-poned

## 2018-07-22 LAB
ANION GAP SERPL CALC-SCNC: 9 MMOL/L (ref 0–18)
BUN BLD-MCNC: 18 MG/DL (ref 8–20)
BUN/CREAT SERPL: 12.2 (ref 10–20)
CALCIUM BLD-MCNC: 9.6 MG/DL (ref 8.3–10.3)
CHLORIDE SERPL-SCNC: 108 MMOL/L (ref 101–111)
CO2 SERPL-SCNC: 22 MMOL/L (ref 22–32)
CREAT BLD-MCNC: 1.47 MG/DL (ref 0.7–1.3)
GLUCOSE BLD-MCNC: 106 MG/DL (ref 70–99)
OSMOLALITY SERPL CALC.SUM OF ELEC: 290 MOSM/KG (ref 275–295)
POTASSIUM SERPL-SCNC: 4.1 MMOL/L (ref 3.6–5.1)
SODIUM SERPL-SCNC: 139 MMOL/L (ref 136–144)

## 2018-07-22 PROCEDURE — 97530 THERAPEUTIC ACTIVITIES: CPT

## 2018-07-22 PROCEDURE — 80048 BASIC METABOLIC PNL TOTAL CA: CPT | Performed by: CLINICAL NURSE SPECIALIST

## 2018-07-22 PROCEDURE — 51702 INSERT TEMP BLADDER CATH: CPT

## 2018-07-22 PROCEDURE — 97116 GAIT TRAINING THERAPY: CPT

## 2018-07-22 RX ORDER — METOPROLOL SUCCINATE 25 MG/1
25 TABLET, EXTENDED RELEASE ORAL
Status: DISCONTINUED | OUTPATIENT
Start: 2018-07-23 | End: 2018-07-23

## 2018-07-22 NOTE — CONSULTS
[facility] Urology  Consultation Note    Kristen Mancia Patient Status:  Inpatient    1925 MRN [id number]   [facility] 2NE-A Attending Bairon Perez MD   Hosp Day # 4 PCP Whitney Gillis MD     Reason for Consultation: UPPER GI ENDOSCOPY,DIAGNOSIS      Comment: distal esophageal ring, hiatal hernia, dilated               15-18 mm  Family History   Problem Relation Age of Onset   • Heart Disorder Father      MI   • Heart Disorder Mother      MI   • Diabetes Son       repo (Oral)   Resp 18   Ht 6' (1.829 m)   Wt 193 lb 12.6 oz (87.9 kg)   SpO2 94%   BMI 26.28 kg/m²   GENERAL: well developed, well nourished, no apparent distress  EYES: sclera non-icteric, no redness   LUNGS: normal respiratory motion without distress  GI: Abd

## 2018-07-22 NOTE — PROGRESS NOTES
[facility] Progress Note    Subjective:  Feeling good this am except with problems urinating. Required straight cath x 2 last night for urinary retention, ~ 400 cc output each time.      Objective:  /48 (BP Location: Left arm)   Pulse 78   Te

## 2018-07-22 NOTE — PHYSICAL THERAPY NOTE
PHYSICAL THERAPY TREATMENT NOTE - INPATIENT    Room Number: 4190/7689-F     Session: 1   Number of Visits to Meet Established Goals: 3    Presenting Problem: s/p TAVR 7/19/18  History related to current admission: 80 y.o male admitted for TAVR 7/18/18.  Pt Static Sitting: Good  Dynamic Sitting: Good           Static Standing: Fair +  Dynamic Standing: Fair    ACTIVITY TOLERANCE  O2 Saturation: 97%  Room air  Heart Rate: 80 to 126 with /    ASSESSMENT   Patient is a 80year old male admitted on 7/18/2018 for TAVR. Pertinent comorbidities and personal factors impacting therapy include pacemaker, HTN, glaucoma.  Patient demonstrated improved mobility this sess

## 2018-07-22 NOTE — PLAN OF CARE
PT here to see patient , see note. Heart rate increased to 120-130 , denies being dizzy. Beta Blocker restarted tonight at lower dose .

## 2018-07-22 NOTE — PLAN OF CARE
16 fr watts catheter inserted for urinary retention without difficulty. Tolerated procedure well, 400 cc of clear yellow urine returned to drainage bag. Secured to leg with STAT loc.

## 2018-07-23 ENCOUNTER — PRIOR ORIGINAL RECORDS (OUTPATIENT)
Dept: OTHER | Age: 83
End: 2018-07-23

## 2018-07-23 VITALS
SYSTOLIC BLOOD PRESSURE: 129 MMHG | WEIGHT: 191.81 LBS | BODY MASS INDEX: 25.98 KG/M2 | DIASTOLIC BLOOD PRESSURE: 49 MMHG | HEART RATE: 79 BPM | OXYGEN SATURATION: 94 % | RESPIRATION RATE: 18 BRPM | TEMPERATURE: 99 F | HEIGHT: 72 IN

## 2018-07-23 LAB
ANION GAP SERPL CALC-SCNC: 10 MMOL/L (ref 0–18)
BUN BLD-MCNC: 18 MG/DL (ref 8–20)
BUN/CREAT SERPL: 12.9 (ref 10–20)
CALCIUM BLD-MCNC: 9.1 MG/DL (ref 8.3–10.3)
CHLORIDE SERPL-SCNC: 108 MMOL/L (ref 101–111)
CO2 SERPL-SCNC: 21 MMOL/L (ref 22–32)
CREAT BLD-MCNC: 1.39 MG/DL (ref 0.7–1.3)
GLUCOSE BLD-MCNC: 98 MG/DL (ref 70–99)
OSMOLALITY SERPL CALC.SUM OF ELEC: 290 MOSM/KG (ref 275–295)
POTASSIUM SERPL-SCNC: 3.9 MMOL/L (ref 3.6–5.1)
SODIUM SERPL-SCNC: 139 MMOL/L (ref 136–144)

## 2018-07-23 PROCEDURE — 80048 BASIC METABOLIC PNL TOTAL CA: CPT | Performed by: UROLOGY

## 2018-07-23 RX ORDER — ALFUZOSIN HYDROCHLORIDE 10 MG/1
10 TABLET, EXTENDED RELEASE ORAL
Qty: 30 TABLET | Refills: 0 | Status: SHIPPED | OUTPATIENT
Start: 2018-07-23

## 2018-07-23 RX ORDER — ASPIRIN 81 MG/1
81 TABLET ORAL DAILY
Qty: 30 TABLET | Refills: 3 | Status: SHIPPED | OUTPATIENT
Start: 2018-07-23 | End: 2018-08-21

## 2018-07-23 RX ORDER — METOPROLOL SUCCINATE 25 MG/1
25 TABLET, EXTENDED RELEASE ORAL DAILY
Qty: 30 TABLET | Refills: 2 | Status: SHIPPED | OUTPATIENT
Start: 2018-07-23 | End: 2018-08-21

## 2018-07-23 NOTE — CM/SW NOTE
SW met with pt today. Plan remains for pt to return home with Nusrat Santillan RN and PT. Pt has been referred to [facility].     Alanna Avila, 07/23/18, 11:08 AM

## 2018-07-23 NOTE — PLAN OF CARE
CARDIOVASCULAR - ADULT    • Maintains optimal cardiac output and hemodynamic stability Adequate for Discharge        DISCHARGE PLANNING    • Discharge to home or other facility with appropriate resources Adequate for Discharge        Impaired Activities of

## 2018-07-23 NOTE — PHYSICAL THERAPY NOTE
Attempted to see Pt this AM - RN Nicolette aware of attempt. Pt currently requesting to rest.  Encouraged Pt to perform stair training later today with RN or PT.   Will f/u later today if time permits, after all other patients are attempted per tentative sche

## 2018-07-23 NOTE — PROGRESS NOTES
[facility]    Subjective:   Resting in bed this am on exam.  No complaints. He currently denies CP, SOB, dizziness, or palpitations. Few episodes tachycardiac/hypotension documented about 0430 this am - asymptomatic.  Lowe rep Montana (TAVR) bioprosthesis was present and     functioning normally. There was no stenosis. Peak velocity (S): 2.4m/sec.     Mean gradient (S): 10mm Hg. 3. Mitral valve: Mildly calcified annulus. There was mild regurgitation.   4. Left atrium: The left at hx medtronic PPM  · Urinary retention, Lowe replaced over weekend per urology, on Uroxatrol  · CAD hx BMS LAD 2010  · CRI - Crt 1.39  · HL - on satatin  · HTN  - some hypotension this am, asymp  · Hypothyroidism - on replacement      Plan:   - uptitrate B

## 2018-07-23 NOTE — DISCHARGE SUMMARY
[facility]  Discharge Summary    Jitendra Swanson Patient Status:  Inpatient    1925 MRN [id number]   [facility] 2NE-A Attending Jai Polanco MD   Hosp Day # 5 PCP Jocelyne Melendrez MD         Admit date: 2018    Discharge Lowe    Consultations:   Urology - Dr. Vinayak Ledesma - Urinary retention    Procedures:   RTF TAVR with 26mm S3 valve on   7/19/18 with Dr.'s Trinh/Dillon/Jignesh      Disposition:  Stable - D/c Home      Discharge Medications:   aspirin 81 MG Oral Tab lift anything heavier than 5lbs for 1 week. · Walk at least 3 times per day for 5-10 minutes at a time. Increase your activity gradually. · Keep legs elevated while sitting. · No tight fitting underwear.      HYGIENE:  · Wash incisions with mild soap an that could result in death.      CONTACT NUMBERS FOR YOUR REFERENCE:  · [facility] (Dr. Jose L Canseco) [phone]  · [facility] (Dr. Kevin Zambrano and Dr. Anna Pritchard) [phone]       Signed:  PATRICIO Marrero  7/23/2018  [phone]

## 2018-07-23 NOTE — PLAN OF CARE
CARDIOVASCULAR - ADULT    • Maintains optimal cardiac output and hemodynamic stability Not Progressing        DISCHARGE PLANNING    • Discharge to home or other facility with appropriate resources Not Progressing        Impaired Activities of Daily Living

## 2018-07-24 NOTE — OPERATIVE REPORT
Cardiovascular Surgery Operative Note  TAVR        INDICATIONS:         80year old with symptomatic aortic stenosis  Pt seen in TAVR clinic    STS Risk Score: 6.97 %        The assessment after discussion with Dr Jose L Canseco  is that the patient is at   intermedi

## 2018-08-01 LAB
BUN: 18 MG/DL
CALCIUM: 9.1 MG/DL
CHLORIDE: 108 MEQ/L
CREATININE, SERUM: 1.39 MG/DL
GLUCOSE: 98 MG/DL
POTASSIUM, SERUM: 3.9 MEQ/L
SODIUM: 139 MEQ/L

## 2018-08-21 ENCOUNTER — HOSPITAL ENCOUNTER (OUTPATIENT)
Dept: CV DIAGNOSTICS | Facility: HOSPITAL | Age: 83
Discharge: HOME | End: 2018-08-21
Attending: INTERNAL MEDICINE

## 2018-08-21 ENCOUNTER — MYAURORA ACCOUNT LINK (OUTPATIENT)
Dept: OTHER | Age: 83
End: 2018-08-21

## 2018-08-21 ENCOUNTER — HOSPITAL ENCOUNTER (OUTPATIENT)
Dept: CARDIOLOGY CLINIC | Facility: HOSPITAL | Age: 83
Discharge: HOME | End: 2018-08-21
Attending: NURSE PRACTITIONER
Payer: MEDICARE

## 2018-08-21 VITALS
RESPIRATION RATE: 14 BRPM | TEMPERATURE: 97 F | DIASTOLIC BLOOD PRESSURE: 48 MMHG | SYSTOLIC BLOOD PRESSURE: 119 MMHG | HEART RATE: 73 BPM | OXYGEN SATURATION: 94 %

## 2018-08-21 DIAGNOSIS — I35.0 AORTIC VALVE STENOSIS, ETIOLOGY OF CARDIAC VALVE DISEASE UNSPECIFIED: ICD-10-CM

## 2018-08-21 LAB
BASOPHILS # BLD AUTO: 0.06 X10(3) UL (ref 0–0.1)
BASOPHILS NFR BLD AUTO: 0.5 %
EOSINOPHIL # BLD AUTO: 1.3 X10(3) UL (ref 0–0.3)
EOSINOPHIL NFR BLD AUTO: 11.6 %
ERYTHROCYTE [DISTWIDTH] IN BLOOD BY AUTOMATED COUNT: 13.6 % (ref 11.5–16)
HCT VFR BLD AUTO: 28.2 % (ref 37–53)
HGB BLD-MCNC: 9.2 G/DL (ref 13–17)
IMMATURE GRANULOCYTE COUNT: 0.07 X10(3) UL (ref 0–1)
IMMATURE GRANULOCYTE RATIO %: 0.6 %
LYMPHOCYTES # BLD AUTO: 1.95 X10(3) UL (ref 0.9–4)
LYMPHOCYTES NFR BLD AUTO: 17.4 %
MCH RBC QN AUTO: 30.2 PG (ref 27–33.2)
MCHC RBC AUTO-ENTMCNC: 32.6 G/DL (ref 31–37)
MCV RBC AUTO: 92.5 FL (ref 80–99)
MONOCYTES # BLD AUTO: 1.1 X10(3) UL (ref 0.1–1)
MONOCYTES NFR BLD AUTO: 9.8 %
NEUTROPHIL ABS PRELIM: 6.75 X10 (3) UL (ref 1.3–6.7)
NEUTROPHILS # BLD AUTO: 6.75 X10(3) UL (ref 1.3–6.7)
NEUTROPHILS NFR BLD AUTO: 60.1 %
PLATELET # BLD AUTO: 191 10(3)UL (ref 150–450)
RBC # BLD AUTO: 3.05 X10(6)UL (ref 3.8–5.8)
RED CELL DISTRIBUTION WIDTH-SD: 46.1 FL (ref 35.1–46.3)
WBC # BLD AUTO: 11.2 X10(3) UL (ref 4–13)

## 2018-08-21 PROCEDURE — 99213 OFFICE O/P EST LOW 20 MIN: CPT | Performed by: NURSE PRACTITIONER

## 2018-08-21 PROCEDURE — 99212 OFFICE O/P EST SF 10 MIN: CPT | Performed by: NURSE PRACTITIONER

## 2018-08-21 PROCEDURE — 94618 PULMONARY STRESS TESTING: CPT | Performed by: NURSE PRACTITIONER

## 2018-08-21 RX ORDER — ASPIRIN 325 MG
325 TABLET ORAL DAILY
COMMUNITY

## 2018-08-21 RX ORDER — MIDODRINE HYDROCHLORIDE 2.5 MG/1
2.5 TABLET ORAL 3 TIMES DAILY
COMMUNITY

## 2018-08-21 RX ORDER — CARVEDILOL 3.12 MG/1
3.12 TABLET ORAL 2 TIMES DAILY WITH MEALS
COMMUNITY

## 2018-08-21 RX ORDER — OXYBUTYNIN CHLORIDE 5 MG/1
5 TABLET ORAL 2 TIMES DAILY
COMMUNITY

## 2018-08-21 NOTE — PROGRESS NOTES
[facility]  TAVR Clinic Note    Subjective:   Patient presents for routine 1 month postop TAVR visit, accompanied by his wife and daugther.  He reports some increasing fatigue since TAVR and his family feels he is sleeping more and his appetite is poor +  Extremities: warm, dry, no edema noted  Neurologic: AAO x 3  Skin: groin incision CDI, well healed    Laboratory/Data:  Echo: 7/20/18  Conclusions:    1. Left ventricle:  The cavity size was at the upper limits of normal. Wall     thickness was normal. S Sodium 50 MCG Oral Tab Take 100 mcg by mouth every other day. Disp:  Rfl:    omeprazole 20 MG Oral Capsule Delayed Release Take 20 mg by mouth 4 (four) times a week.  Disp:  Rfl:    Multiple Vitamins-Minerals (MULTI-VITAMIN/MINERALS) Oral Tab Take 1 tablet slowly. 8. Will check CBC today and call with results. I have spent 45 minutes with this patient with > 50% of my time spent in education, coordination, and counseling related to his care.     PATRICIO Enciso  8/21/2018   10:53 AM

## 2018-08-21 NOTE — PROGRESS NOTES
Pt here for 1mo TAVR follow-up. KCCQ & 5 meter walk test completed.     5 Meter Walk Test:  Attempt #1: 11.3 seconds  Attempt #2: 9.2 seconds  Attempt #3: 10.6 seconds

## 2018-08-22 ENCOUNTER — TELEPHONE (OUTPATIENT)
Dept: CARDIOLOGY CLINIC | Facility: HOSPITAL | Age: 83
End: 2018-08-22

## 2018-08-22 NOTE — PROGRESS NOTES
Called patient with results of echo yesterday. Patient's EF improved to 68%, aortic valve is functioning normally with no rocking motion. Mean gradient is 12mmHg, trivial regurgitation.   Overall, there has been no significant interval changes compared to p

## 2018-09-04 ENCOUNTER — PRIOR ORIGINAL RECORDS (OUTPATIENT)
Dept: OTHER | Age: 83
End: 2018-09-04

## 2018-09-04 ENCOUNTER — MYAURORA ACCOUNT LINK (OUTPATIENT)
Dept: OTHER | Age: 83
End: 2018-09-04

## 2018-09-06 ENCOUNTER — PRIOR ORIGINAL RECORDS (OUTPATIENT)
Dept: OTHER | Age: 83
End: 2018-09-06

## 2018-09-07 ENCOUNTER — PRIOR ORIGINAL RECORDS (OUTPATIENT)
Dept: OTHER | Age: 83
End: 2018-09-07

## 2018-09-10 ENCOUNTER — PRIOR ORIGINAL RECORDS (OUTPATIENT)
Dept: OTHER | Age: 83
End: 2018-09-10

## 2018-09-10 ENCOUNTER — MYAURORA ACCOUNT LINK (OUTPATIENT)
Dept: OTHER | Age: 83
End: 2018-09-10

## 2018-10-08 ENCOUNTER — LAB REQUISITION (OUTPATIENT)
Dept: LAB | Facility: HOSPITAL | Age: 83
End: 2018-10-08
Payer: MEDICARE

## 2018-10-08 DIAGNOSIS — D62 ACUTE POSTHEMORRHAGIC ANEMIA: ICD-10-CM

## 2018-10-08 DIAGNOSIS — K25.0 ACUTE GASTRIC ULCER WITH HEMORRHAGE: ICD-10-CM

## 2018-10-08 PROCEDURE — 85025 COMPLETE CBC W/AUTO DIFF WBC: CPT | Performed by: INTERNAL MEDICINE

## 2019-02-28 VITALS
WEIGHT: 196 LBS | HEIGHT: 72 IN | RESPIRATION RATE: 16 BRPM | SYSTOLIC BLOOD PRESSURE: 76 MMHG | DIASTOLIC BLOOD PRESSURE: 72 MMHG | BODY MASS INDEX: 26.55 KG/M2

## 2019-02-28 VITALS — DIASTOLIC BLOOD PRESSURE: 70 MMHG | HEART RATE: 77 BPM | SYSTOLIC BLOOD PRESSURE: 118 MMHG | HEIGHT: 72 IN

## 2019-02-28 VITALS
HEART RATE: 76 BPM | BODY MASS INDEX: 27.5 KG/M2 | DIASTOLIC BLOOD PRESSURE: 70 MMHG | WEIGHT: 203 LBS | SYSTOLIC BLOOD PRESSURE: 124 MMHG | HEIGHT: 72 IN

## 2019-02-28 VITALS
BODY MASS INDEX: 26.61 KG/M2 | HEIGHT: 72 IN | HEART RATE: 83 BPM | DIASTOLIC BLOOD PRESSURE: 63 MMHG | SYSTOLIC BLOOD PRESSURE: 120 MMHG | RESPIRATION RATE: 16 BRPM | WEIGHT: 196.5 LBS

## 2019-02-28 VITALS — HEART RATE: 68 BPM | WEIGHT: 202 LBS | DIASTOLIC BLOOD PRESSURE: 60 MMHG | SYSTOLIC BLOOD PRESSURE: 122 MMHG

## 2019-03-01 VITALS
HEIGHT: 72 IN | DIASTOLIC BLOOD PRESSURE: 60 MMHG | HEART RATE: 80 BPM | WEIGHT: 197 LBS | BODY MASS INDEX: 26.68 KG/M2 | SYSTOLIC BLOOD PRESSURE: 114 MMHG

## 2019-05-10 DIAGNOSIS — Z95.2 S/P TAVR (TRANSCATHETER AORTIC VALVE REPLACEMENT): Primary | ICD-10-CM

## 2019-05-22 DIAGNOSIS — Z95.2 S/P TAVR (TRANSCATHETER AORTIC VALVE REPLACEMENT): Primary | ICD-10-CM

## 2019-06-12 ENCOUNTER — TELEPHONE (OUTPATIENT)
Dept: CARDIOLOGY | Age: 84
End: 2019-06-12

## 2019-06-20 DIAGNOSIS — Z95.2 S/P TAVR (TRANSCATHETER AORTIC VALVE REPLACEMENT): Primary | ICD-10-CM

## 2019-07-16 ENCOUNTER — HOSPITAL ENCOUNTER (OUTPATIENT)
Dept: CV DIAGNOSTICS | Facility: HOSPITAL | Age: 84
Discharge: HOME | End: 2019-07-16
Attending: INTERNAL MEDICINE
Payer: MEDICARE

## 2019-07-16 ENCOUNTER — HOSPITAL ENCOUNTER (OUTPATIENT)
Dept: CARDIOLOGY CLINIC | Facility: HOSPITAL | Age: 84
Discharge: HOME | End: 2019-07-16
Attending: INTERNAL MEDICINE
Payer: MEDICARE

## 2019-07-16 VITALS
SYSTOLIC BLOOD PRESSURE: 148 MMHG | DIASTOLIC BLOOD PRESSURE: 58 MMHG | HEART RATE: 82 BPM | RESPIRATION RATE: 14 BRPM | OXYGEN SATURATION: 97 %

## 2019-07-16 DIAGNOSIS — Z95.2 S/P TAVR (TRANSCATHETER AORTIC VALVE REPLACEMENT): ICD-10-CM

## 2019-07-16 DIAGNOSIS — I44.2 COMPLETE HEART BLOCK (HCC): ICD-10-CM

## 2019-07-16 DIAGNOSIS — I50.32 CHRONIC DIASTOLIC HF (HEART FAILURE) (HCC): Primary | ICD-10-CM

## 2019-07-16 PROCEDURE — 93306 TTE W/DOPPLER COMPLETE: CPT | Performed by: INTERNAL MEDICINE

## 2019-07-16 PROCEDURE — 99214 OFFICE O/P EST MOD 30 MIN: CPT | Performed by: NURSE PRACTITIONER

## 2019-07-16 NOTE — PROGRESS NOTES
[facility]  TAVR Clinic Note    Subjective:   Patient presents for 1 year postop TAVR APN visit, accompanied by his wife. They spent the winter in [place].  He and his wife report that he continues to experience fatigue and dyspnea on exertion with no left atrium was mildly dilated. 5. Pulmonary arteries: Systolic pressure was mildly increased, estimated to     be 37mm Hg.     Impressions:  This study is compared with previous dated 07-20-18: Compared  to the prior study, there has been no significant i mg by mouth 4 (four) times a week. Disp:  Rfl:    Acetaminophen (TYLENOL 8 HOUR OR) Take by mouth nightly. Disp:  Rfl:    Glucosamine-Chondroit-Vit C-Mn (GLUCOSAMINE CHONDR 1500 COMPLX OR) Take  by mouth.  Disp:  Rfl:    aspirin 325 MG Oral Tab Take 325 APRN  7/16/2019   1:54 PM

## 2019-07-17 ENCOUNTER — TELEPHONE (OUTPATIENT)
Dept: CARDIOLOGY CLINIC | Facility: HOSPITAL | Age: 84
End: 2019-07-17

## 2019-07-17 ENCOUNTER — DOCUMENTATION (OUTPATIENT)
Dept: CARDIOLOGY | Age: 84
End: 2019-07-17

## 2019-07-17 DIAGNOSIS — Z95.2 S/P TAVR (TRANSCATHETER AORTIC VALVE REPLACEMENT): ICD-10-CM

## 2019-07-18 ENCOUNTER — TELEPHONE (OUTPATIENT)
Dept: CARDIOLOGY CLINIC | Facility: HOSPITAL | Age: 84
End: 2019-07-18

## 2019-07-19 ENCOUNTER — TELEPHONE (OUTPATIENT)
Dept: CARDIOLOGY CLINIC | Facility: HOSPITAL | Age: 84
End: 2019-07-19

## 2019-07-19 NOTE — TELEPHONE ENCOUNTER
Attempted to call patient several times over the last 3 days to relay results of echocardiogram. Have left messages to return call. Will forward results to Dr. Raimundo Dangelo office for review.      Santana Driver, APRN  7/19/2019  11:53 AM  [phone]

## 2019-07-23 ENCOUNTER — TELEPHONE (OUTPATIENT)
Dept: CARDIOLOGY CLINIC | Facility: HOSPITAL | Age: 84
End: 2019-07-23

## 2019-07-25 ENCOUNTER — TELEPHONE (OUTPATIENT)
Dept: CARDIOLOGY CLINIC | Facility: HOSPITAL | Age: 84
End: 2019-07-25

## 2019-07-30 ENCOUNTER — TELEPHONE (OUTPATIENT)
Dept: CARDIOLOGY CLINIC | Facility: HOSPITAL | Age: 84
End: 2019-07-30

## 2019-07-30 NOTE — TELEPHONE ENCOUNTER
Spoke with  Mamie Braydon today and relayed results of echo 7/16. Patient's EF has dropped to  25-30%, aortic valve is functioning normally with no rocking motion. Mean gradient is 15mmHg, trivial regurgitation.   Patient has follow up this afternoon with

## 2022-06-04 ENCOUNTER — TELEPHONE ENCOUNTER (OUTPATIENT)
Dept: URBAN - METROPOLITAN AREA CLINIC 68 | Facility: CLINIC | Age: 87
End: 2022-06-04

## 2022-06-04 RX ORDER — MIRABEGRON 50 MG/1
MYRBETRIQ( 50MG ORAL  DAILY ) INACTIVE -HX ENTRY TABLET, FILM COATED, EXTENDED RELEASE ORAL DAILY
OUTPATIENT
Start: 2020-03-12

## 2022-06-04 RX ORDER — MAGNESIUM HYDROXIDE 400 MG/5ML
GLUCOSAMINE CHONDR 500 COMPLEX(  ORAL  DAILY ) INACTIVE -HX ENTRY SUSPENSION, ORAL (FINAL DOSE FORM) ORAL DAILY
OUTPATIENT
Start: 2020-03-12

## 2022-06-04 RX ORDER — ASPIRIN 325 MG/1
ASPIRIN( 325MG ORAL  DAILY ) INACTIVE -HX ENTRY TABLET ORAL DAILY
OUTPATIENT
Start: 2020-03-12

## 2022-06-04 RX ORDER — ATORVASTATIN CALCIUM 40 MG/1
ATORVASTATIN CALCIUM( 40MG ORAL  DAILY ) INACTIVE -HX ENTRY TABLET, FILM COATED ORAL DAILY
OUTPATIENT
Start: 2020-03-12

## 2022-06-04 RX ORDER — BICALUTAMIDE 50 MG/1
CASODEX( 50MG ORAL  DAILY ) INACTIVE -HX ENTRY TABLET ORAL DAILY
OUTPATIENT
Start: 2020-03-12

## 2022-06-05 ENCOUNTER — TELEPHONE ENCOUNTER (OUTPATIENT)
Dept: URBAN - METROPOLITAN AREA CLINIC 68 | Facility: CLINIC | Age: 87
End: 2022-06-05

## 2022-06-05 RX ORDER — OMEGA-3S/DHA/EPA/FISH OIL/D3 300MG-1000
IRON( 325 (65 FE)MG ORAL 1 DAILY ) ACTIVE -HX ENTRY CAPSULE ORAL DAILY
Status: ACTIVE | COMMUNITY
Start: 2020-03-30

## 2022-06-05 RX ORDER — CLOPIDOGREL 75 MG/1
CLOPIDOGREL BISULFATE( 75MG ORAL 1 DAILY ) ACTIVE -HX ENTRY TABLET ORAL DAILY
Status: ACTIVE | COMMUNITY
Start: 2020-03-30

## 2022-06-05 RX ORDER — MIDODRINE HYDROCHLORIDE 2.5 MG/1
MIDODRINE HCL( 2.5MG ORAL 1 THREE TIMES DAILY ) ACTIVE -HX ENTRY TABLET ORAL
Status: ACTIVE | COMMUNITY
Start: 2020-03-30

## 2022-06-05 RX ORDER — ESOMEPRAZOLE MAGNESIUM 40 MG
NEXIUM( 40MG ORAL 1 DAILY ) ACTIVE -HX ENTRY CAPSULE,DELAYED RELEASE (ENTERIC COATED) ORAL DAILY
Status: ACTIVE | COMMUNITY
Start: 2020-03-30

## 2022-06-05 RX ORDER — VITAM B12 100 MCG
VITAMIN B12( 100MCG ORAL   ) ACTIVE -HX ENTRY TAB ORAL
Status: ACTIVE | COMMUNITY
Start: 2020-03-30

## 2022-06-05 RX ORDER — MULTIVITAMIN
MULTIVITAMINS(  ORAL  DAILY ) ACTIVE -HX ENTRY CAPSULE ORAL DAILY
Status: ACTIVE | COMMUNITY
Start: 2020-03-30

## 2022-06-05 RX ORDER — LEVOTHYROXINE SODIUM 75 UG/1
SYNTHROID( 75MCG ORAL  DAILY ) ACTIVE -HX ENTRY TABLET ORAL DAILY
Status: ACTIVE | COMMUNITY
Start: 2020-03-30

## 2022-06-25 ENCOUNTER — TELEPHONE ENCOUNTER (OUTPATIENT)
Age: 87
End: 2022-06-25

## 2022-06-25 RX ORDER — MAGNESIUM HYDROXIDE 400 MG/5ML
GLUCOSAMINE CHONDR 500 COMPLEX(  ORAL  DAILY ) INACTIVE -HX ENTRY SUSPENSION, ORAL (FINAL DOSE FORM) ORAL DAILY
OUTPATIENT
Start: 2020-03-12

## 2022-06-25 RX ORDER — BICALUTAMIDE 50 MG/1
CASODEX( 50MG ORAL  DAILY ) INACTIVE -HX ENTRY TABLET ORAL DAILY
OUTPATIENT
Start: 2020-03-12

## 2022-06-25 RX ORDER — ATORVASTATIN CALCIUM 40 MG/1
ATORVASTATIN CALCIUM( 40MG ORAL  DAILY ) INACTIVE -HX ENTRY TABLET, FILM COATED ORAL DAILY
OUTPATIENT
Start: 2020-03-12

## 2022-06-25 RX ORDER — ASPIRIN 325 MG/1
ASPIRIN( 325MG ORAL  DAILY ) INACTIVE -HX ENTRY TABLET ORAL DAILY
OUTPATIENT
Start: 2020-03-12

## 2022-06-25 RX ORDER — MIRABEGRON 50 MG/1
MYRBETRIQ( 50MG ORAL  DAILY ) INACTIVE -HX ENTRY TABLET, FILM COATED, EXTENDED RELEASE ORAL DAILY
OUTPATIENT
Start: 2020-03-12

## 2022-06-26 ENCOUNTER — TELEPHONE ENCOUNTER (OUTPATIENT)
Age: 87
End: 2022-06-26

## 2022-06-26 RX ORDER — FERROUS SULFATE 325(65) MG
IRON( 325 (65 FE)MG ORAL 1 DAILY ) ACTIVE -HX ENTRY TABLET ORAL DAILY
Status: ACTIVE | COMMUNITY
Start: 2020-03-30

## 2022-06-26 RX ORDER — ASPIRIN 81 MG/1
LOW-DOSE ASPIRIN( 81MG ORAL 1 DAILY ) ACTIVE -HX ENTRY TABLET, COATED ORAL DAILY
Status: ACTIVE | COMMUNITY
Start: 2020-03-30

## 2022-06-26 RX ORDER — ACETAMINOPHEN 325 MG/1
ACETAMINOPHEN( 325MG ORAL  2-4 TABS DAILY ) ACTIVE -HX ENTRY TABLET ORAL
Status: ACTIVE | COMMUNITY
Start: 2020-03-30

## 2022-06-26 RX ORDER — ESOMEPRAZOLE MAGNESIUM 40 MG
NEXIUM( 40MG ORAL 1 DAILY ) ACTIVE -HX ENTRY CAPSULE,DELAYED RELEASE (ENTERIC COATED) ORAL DAILY
Status: ACTIVE | COMMUNITY
Start: 2020-03-30

## 2022-06-26 RX ORDER — FLUTICASONE PROPIONATE 50 UG/1
FLONASE( 50MCG/ACT NASAL  DAILY ) ACTIVE -HX ENTRY SPRAY, METERED NASAL DAILY
Status: ACTIVE | COMMUNITY
Start: 2020-03-30

## 2022-06-26 RX ORDER — VITAM B12 100 MCG
VITAMIN B12( 100MCG ORAL   ) ACTIVE -HX ENTRY TAB ORAL
Status: ACTIVE | COMMUNITY
Start: 2020-03-30

## 2022-06-26 RX ORDER — LEVOTHYROXINE SODIUM 75 MCG
SYNTHROID( 75MCG ORAL  DAILY ) ACTIVE -HX ENTRY TABLET ORAL DAILY
Status: ACTIVE | COMMUNITY
Start: 2020-03-30

## 2022-06-26 RX ORDER — CLOPIDOGREL BISULFATE 75 MG/1
CLOPIDOGREL BISULFATE( 75MG ORAL 1 DAILY ) ACTIVE -HX ENTRY TABLET, FILM COATED ORAL DAILY
Status: ACTIVE | COMMUNITY
Start: 2020-03-30

## 2022-06-26 RX ORDER — MIDODRINE HYDROCHLORIDE 2.5 MG/1
MIDODRINE HCL( 2.5MG ORAL 1 THREE TIMES DAILY ) ACTIVE -HX ENTRY TABLET ORAL
Status: ACTIVE | COMMUNITY
Start: 2020-03-30

## 2023-05-28 NOTE — PLAN OF CARE
Problem: Patient/Family Goals  Goal: Patient/Family Long Term Goal  Patient's Long Term Goal: \"To get back to walking again and climbing the stairs. \"    Interventions:  - Increase amount of exercise slowly and steadily.  -Discharge planning  -PT/OT  - Se Consider OT/PT consult to assist with strengthening/mobility  - Encourage toileting schedule   Outcome: Progressing  Fall precautions in progress      Problem: DISCHARGE PLANNING  Goal: Discharge to home or other facility with appropriate resources  INTERV Mitral valve regurgitation Mitral valve regurgitation Mitral valve regurgitation Ischemic cardiomyopathy Mitral valve regurgitation Mitral valve regurgitation

## (undated) DEVICE — CLAMP INSERT: Brand: STEALTH® CLAMP INSERT

## (undated) DEVICE — SOL LACT RINGERS 1000ML

## (undated) DEVICE — CELL SAVER BAG 600ML 4R2023

## (undated) DEVICE — OPEN HEART: Brand: MEDLINE INDUSTRIES, INC.

## (undated) DEVICE — STERILE POLYISOPRENE POWDER-FREE SURGICAL GLOVES: Brand: PROTEXIS

## (undated) DEVICE — CELL SAVER 5/5+ BOWL KIT-225ML: Brand: HAEMONETICS CELL SAVER 5/5+ SYSTEMS

## (undated) DEVICE — GAUZE SPONGES,12 PLY: Brand: CURITY

## (undated) DEVICE — GLOVE SURG TRIUMPH SZ 8

## (undated) DEVICE — BLADE STERNAL SAW BULK PACK

## (undated) DEVICE — CELL SAVER RESERVOIR BRAT

## (undated) DEVICE — TAPE UMBILICAL U11T

## (undated) DEVICE — TRANSPOSAL ULTRAFLEX DUO/QUAD ULTRA CART MANIFOLD

## (undated) NOTE — LETTER
[facility]  Daniel Ackerman 61 [address] [facility], [address] [place] [address]    Consent for Operation    Date: __________________    Time: _______________    1.  I authorize the performance upon Zohaib Dys the following operation:    Procedure(s):  aortic valve repla procedure has been videotaped, the surgeon will obtain the original videotape. The hospital will not be responsible for storage or maintenance of this tape.     6. For the purpose of advancing medical education, I consent to the admittance of observers to t STATEMENTS REQUIRING INSERTION OR COMPLETION WERE FILLED IN.     Signature of Patient:   ___________________________    When the patient is a minor or mentally incompetent to give consent:  Signature of person authorized to consent for patient: ____________ drugs/illegal medications). Failure to inform my anesthesiologist about these medicines may increase my risk of anesthetic complications. · If I am allergic to anything or have had a reaction to anesthesia before.     3. I understand how the anesthesia med I have discussed the procedure and information above with the patient (or patient’s representative) and answered their questions. The patient or their representative has agreed to have anesthesia services.     _______________________________________________

## (undated) NOTE — LETTER
[facility]  Daniel Ackerman 61 [id number] [facility], [address] [place] [address]    Consent for Operation    Date: __________________    Time: _______________    1.  I authorize the performance upon Antonio Coronado the following operation:    Procedure(s):  Transcatheter aort procedure has been videotaped, the surgeon will obtain the original videotape. The hospital will not be responsible for storage or maintenance of this tape.     6. For the purpose of advancing medical education, I consent to the admittance of observers to t STATEMENTS REQUIRING INSERTION OR COMPLETION WERE FILLED IN.     Signature of Patient:   ___________________________    When the patient is a minor or mentally incompetent to give consent:  Signature of person authorized to consent for patient: ____________ drugs/illegal medications). Failure to inform my anesthesiologist about these medicines may increase my risk of anesthetic complications. · If I am allergic to anything or have had a reaction to anesthesia before.     3. I understand how the anesthesia med I have discussed the procedure and information above with the patient (or patient’s representative) and answered their questions. The patient or their representative has agreed to have anesthesia services.     _______________________________________________

## (undated) NOTE — LETTER
[facility]  Daniel Ackerman 61 [id number] [facility], [address] [place] [address]    Consent for Operation    Date: __________________    Time: _______________    1.  I authorize the performance upon Deneice Conn the following operation:    Procedure(s):  aortic valve repla procedure has been videotaped, the surgeon will obtain the original videotape. The hospital will not be responsible for storage or maintenance of this tape.     6. For the purpose of advancing medical education, I consent to the admittance of observers to t STATEMENTS REQUIRING INSERTION OR COMPLETION WERE FILLED IN.     Signature of Patient:   ___________________________    When the patient is a minor or mentally incompetent to give consent:  Signature of person authorized to consent for patient: ____________ drugs/illegal medications). Failure to inform my anesthesiologist about these medicines may increase my risk of anesthetic complications. · If I am allergic to anything or have had a reaction to anesthesia before.     3. I understand how the anesthesia med I have discussed the procedure and information above with the patient (or patient’s representative) and answered their questions. The patient or their representative has agreed to have anesthesia services.     _______________________________________________